# Patient Record
Sex: FEMALE | Race: WHITE
[De-identification: names, ages, dates, MRNs, and addresses within clinical notes are randomized per-mention and may not be internally consistent; named-entity substitution may affect disease eponyms.]

---

## 2019-02-06 ENCOUNTER — HOSPITAL ENCOUNTER (EMERGENCY)
Dept: HOSPITAL 80 - FED | Age: 24
Discharge: HOME | End: 2019-02-06
Payer: COMMERCIAL

## 2019-02-06 VITALS — DIASTOLIC BLOOD PRESSURE: 85 MMHG | SYSTOLIC BLOOD PRESSURE: 132 MMHG

## 2019-02-06 DIAGNOSIS — E86.9: ICD-10-CM

## 2019-02-06 DIAGNOSIS — K59.00: ICD-10-CM

## 2019-02-06 DIAGNOSIS — R10.31: Primary | ICD-10-CM

## 2019-02-06 LAB — PLATELET # BLD: 255 10^3/UL (ref 150–400)

## 2024-12-19 NOTE — EDPHY
H & P


Time Seen by Provider: 02/06/19 17:00


HPI/ROS: 





Chief complaint.  Abdominal pain





HPI.  The patient is 23-year-old female with abdominal pain that began at 11:00 

a.m. This morning.  She was feeling well earlier.  She stood up after a meeting 

and began to have right lower quadrant abdominal pain.  She describes as sharp.

  No radiation to the back.  No nausea vomiting or diarrhea.  No urinary 

symptoms.  No fever.  Pain is worse with standing.  No previous similar 

symptoms or previous abdominal surgery.  No chest discomfort or trouble 

breathing





ROS


10 systems were reviewed and negative with the exception of the elements 

mentioned in the history of present illness





Past Medical/Surgical History: 





Healthy


Social History: 





Single, nonsmoker, no alcohol


Smoking Status: Never smoked


Physical Exam: 





General Appearance:  Alert pleasant well-developed female mild distress vital 

signs are stable


Eyes: Pupils equal and round no pallor or injection.


ENT, Mouth:  Mucous membranes are moist.


Respiratory:  There are no retractions, lungs are clear to auscultation.


Cardiovascular: Regular rate and rhythm.


Gastrointestinal:  Abdomen is soft with tenderness in the right lower quadrant 

at McBurney's point.  No significant tenderness in the right adnexal area.  No 

masses.  Normal bowel sounds.  No left-sided tenderness.  No right flank 

tenderness.


Neurological:  Awake and alert, sensory and motor exams grossly normal.


Skin: Warm and dry, no rashes.


Musculoskeletal:  Neck is supple nontender.


Extremities  symmetrical, full range of motion.


Psychiatric: Patient is oriented X 3, there is no agitation.


Constitutional: 


 Initial Vital Signs











Temperature (C)  36.9 C   02/06/19 16:39


 


Heart Rate  76   02/06/19 16:39


 


Respiratory Rate  18   02/06/19 16:39


 


Blood Pressure  136/86 H  02/06/19 16:39


 


O2 Sat (%)  98   02/06/19 16:39








 











O2 Delivery Mode               Room Air














Allergies/Adverse Reactions: 


 





No Known Allergies Allergy (Unverified 02/06/19 16:38)


 








Home Medications: 














 Medication  Instructions  Recorded


 


NK [No Known Home Meds]  02/06/19














Medical Decision Making





- Diagnostics


Imaging Results: 


 Imaging Impressions





Abdomen Ultrasound  02/06/19 17:19


Impression: Nonvisualization of the appendix with no secondary evidence of 

appendicitis. 


 


Findings discussed with JADON RING 2/6/2019 at 18:49.  








Pelvic/Renal Ultrasound  02/06/19 17:19


Impression: Normal pelvic ultrasound.


 


Findings discussed with JADON RING 2/6/2019 at 18:49.  


 


 








Abdomen CT  02/06/19 18:50


Impression: 


1. Constipation in a moderately redundant colon.


2. Normal appendix.


 


 Final concordant results discussed with Dr. Jadon Ring at 7:25 PM.


 


General information for patients regarding this examination can be found at 

[email or web address].


 


If you have questions or comments about this report, please contact me at [phone] (hospital) or [phone] (cell). 


 








CT abdomen and pelvis with IV contrast reviewed by me and discussed with Dr. Oppenheimer shows normal appendix.  Constipation is present


Procedures: 





IV normal saline


ED Course/Re-evaluation: 





Re-evaluation 7:35 p.m..  Patient is stable.  She and I discussed imaging and 

lab results.  We discussed treatment plan including criteria for return 

importance of follow-up and further evaluation.  She expresses understanding 

and agreement


Differential Diagnosis: 





I considered appendicitis, urinary tract infection, ectopic pregnancy, ovarian 

cyst





- Data Points


Laboratory Results: 


 Laboratory Results





 02/06/19 17:10 





 02/06/19 17:10 





 











  02/06/19 02/06/19 02/06/19





  17:55 17:10 17:10


 


WBC      





    


 


RBC      





    


 


Hgb      





    


 


Hct      





    


 


MCV      





    


 


MCH      





    


 


MCHC      





    


 


RDW      





    


 


Plt Count      





    


 


MPV      





    


 


Neut % (Auto)      





    


 


Lymph % (Auto)      





    


 


Mono % (Auto)      





    


 


Eos % (Auto)      





    


 


Baso % (Auto)      





    


 


Nucleat RBC Rel Count      





    


 


Absolute Neuts (auto)      





    


 


Absolute Lymphs (auto)      





    


 


Absolute Monos (auto)      





    


 


Absolute Eos (auto)      





    


 


Absolute Basos (auto)      





    


 


Absolute Nucleated RBC      





    


 


Immature Gran %      





    


 


Immature Gran #      





    


 


Sodium      139 mEq/L mEq/L





     (135-145) 


 


Potassium      3.9 mEq/L mEq/L





     (3.5-5.2) 


 


Chloride      104 mEq/L mEq/L





     () 


 


Carbon Dioxide      24 mEq/l mEq/l





     (22-31) 


 


Anion Gap      11 mEq/L mEq/L





     (6-14) 


 


BUN      18 mg/dL mg/dL





     (7-23) 


 


Creatinine      0.7 mg/dL mg/dL





     (0.6-1.0) 


 


Estimated GFR      > 60 





    


 


Glucose      95 mg/dL mg/dL





     () 


 


Calcium      9.7 mg/dL mg/dL





     (8.5-10.4) 


 


Beta HCG, Qual    NEGATIVE   





    


 


Urine Color  YELLOW     





    


 


Urine Appearance  CLEAR     





    


 


Urine pH  6.0     





   (5.0-7.5)   


 


Ur Specific Gravity  1.020     





   (1.002-1.030)   


 


Urine Protein  NEGATIVE     





   (NEGATIVE)   


 


Urine Ketones  NEGATIVE     





   (NEGATIVE)   


 


Urine Blood  NEGATIVE     





   (NEGATIVE)   


 


Urine Nitrate  NEGATIVE     





   (NEGATIVE)   


 


Urine Bilirubin  NEGATIVE     





   (NEGATIVE)   


 


Urine Urobilinogen  NEGATIVE EU EU    





   (0.2-1.0)   


 


Ur Leukocyte Esterase  NEGATIVE     





   (NEGATIVE)   


 


Urine RBC  1-3 /hpf /hpf    





   (0-3)   


 


Urine WBC  1-3 /hpf /hpf    





   (0-3)   


 


Ur Epithelial Cells  TRACE /lpf /lpf    





   (NONE-1+)   


 


Urine Glucose  NEGATIVE     





   (NEGATIVE)   














  02/06/19





  17:10


 


WBC  7.31 10^3/uL 10^3/uL





   (3.80-9.50) 


 


RBC  4.94 10^6/uL 10^6/uL





   (4.18-5.33) 


 


Hgb  14.7 g/dL g/dL





   (12.6-16.3) 


 


Hct  44.7 % %





   (38.0-47.0) 


 


MCV  90.5 fL fL





   (81.5-99.8) 


 


MCH  29.8 pg pg





   (27.9-34.1) 


 


MCHC  32.9 g/dL g/dL





   (32.4-36.7) 


 


RDW  12.6 % %





   (11.5-15.2) 


 


Plt Count  255 10^3/uL 10^3/uL





   (150-400) 


 


MPV  10.4 fL fL





   (8.7-11.7) 


 


Neut % (Auto)  58.9 % %





   (39.3-74.2) 


 


Lymph % (Auto)  34.3 % %





   (15.0-45.0) 


 


Mono % (Auto)  4.9 % %





   (4.5-13.0) 


 


Eos % (Auto)  1.1 % %





   (0.6-7.6) 


 


Baso % (Auto)  0.7 % %





   (0.3-1.7) 


 


Nucleat RBC Rel Count  0.0 % %





   (0.0-0.2) 


 


Absolute Neuts (auto)  4.30 10^3/uL 10^3/uL





   (1.70-6.50) 


 


Absolute Lymphs (auto)  2.51 10^3/uL 10^3/uL





   (1.00-3.00) 


 


Absolute Monos (auto)  0.36 10^3/uL 10^3/uL





   (0.30-0.80) 


 


Absolute Eos (auto)  0.08 10^3/uL 10^3/uL





   (0.03-0.40) 


 


Absolute Basos (auto)  0.05 10^3/uL 10^3/uL





   (0.02-0.10) 


 


Absolute Nucleated RBC  0.00 10^3/uL 10^3/uL





   (0-0.01) 


 


Immature Gran %  0.1 % %





   (0.0-1.1) 


 


Immature Gran #  0.01 10^3/uL 10^3/uL





   (0.00-0.10) 


 


Sodium  





  


 


Potassium  





  


 


Chloride  





  


 


Carbon Dioxide  





  


 


Anion Gap  





  


 


BUN  





  


 


Creatinine  





  


 


Estimated GFR  





  


 


Glucose  





  


 


Calcium  





  


 


Beta HCG, Qual  





  


 


Urine Color  





  


 


Urine Appearance  





  


 


Urine pH  





  


 


Ur Specific Gravity  





  


 


Urine Protein  





  


 


Urine Ketones  





  


 


Urine Blood  





  


 


Urine Nitrate  





  


 


Urine Bilirubin  





  


 


Urine Urobilinogen  





  


 


Ur Leukocyte Esterase  





  


 


Urine RBC  





  


 


Urine WBC  





  


 


Ur Epithelial Cells  





  


 


Urine Glucose  





  











Medications Given: 


 








Discontinued Medications





Sodium Chloride (Ns)  1,000 mls @ 0 mls/hr IV EDNOW ONE; Wide Open


   PRN Reason: Protocol


   Stop: 02/06/19 17:20


   Last Admin: 02/06/19 17:33 Dose:  1,000 mls








Departure





- Departure


Disposition: Home, Routine, Self-Care


Clinical Impression: 


Abdominal pain


Qualifiers:


 Abdominal location: right lower quadrant Qualified Code(s): R10.31 - Right 

lower quadrant pain





Condition: Good


Instructions:  High Fiber Diet (ED), Constipation (ED)


Additional Instructions: 


Increased fluids including fruit and prune juice.  Milk of magnesia, magnesium 

citrate to help with constipation





Activity as tolerated





Return for worsening symptoms.  Re-evaluation 1-2 days for continuing symptoms TRIED TO CALL PT BUT NO VM JUST RINGING.